# Patient Record
Sex: FEMALE | Race: WHITE | NOT HISPANIC OR LATINO | Employment: FULL TIME | URBAN - METROPOLITAN AREA
[De-identification: names, ages, dates, MRNs, and addresses within clinical notes are randomized per-mention and may not be internally consistent; named-entity substitution may affect disease eponyms.]

---

## 2023-07-31 ENCOUNTER — HOSPITAL ENCOUNTER (OUTPATIENT)
Dept: DATA CONVERSION | Facility: HOSPITAL | Age: 68
End: 2023-07-31
Attending: OTOLARYNGOLOGY | Admitting: OTOLARYNGOLOGY
Payer: COMMERCIAL

## 2023-07-31 DIAGNOSIS — E21.3 HYPERPARATHYROIDISM, UNSPECIFIED (MULTI): ICD-10-CM

## 2023-07-31 LAB
INTRAOPERATIVE PTH: 150 PG/ML (ref 12–88)
INTRAOPERATIVE PTH: 44 PG/ML (ref 12–88)
INTRAOPERATIVE PTH: 90 PG/ML (ref 12–88)

## 2023-08-08 LAB
COMPLETE PATHOLOGY REPORT: NORMAL
CONVERTED CLINICAL DIAGNOSIS-HISTORY: NORMAL
CONVERTED FINAL DIAGNOSIS: NORMAL
CONVERTED FINAL REPORT PDF LINK TO COPY AND PASTE: NORMAL
CONVERTED GROSS DESCRIPTION: NORMAL
CONVERTED INTRAOPERATIVE DIAGNOSIS: NORMAL

## 2023-08-16 LAB
ALBUMIN (G/DL) IN SER/PLAS: 4.2 G/DL (ref 3.4–5)
ANION GAP IN SER/PLAS: 11 MMOL/L (ref 10–20)
CALCIUM (MG/DL) IN SER/PLAS: 9.3 MG/DL (ref 8.6–10.6)
CARBON DIOXIDE, TOTAL (MMOL/L) IN SER/PLAS: 28 MMOL/L (ref 21–32)
CHLORIDE (MMOL/L) IN SER/PLAS: 105 MMOL/L (ref 98–107)
CREATININE (MG/DL) IN SER/PLAS: 0.72 MG/DL (ref 0.5–1.05)
GFR FEMALE: >90 ML/MIN/1.73M2
GLUCOSE (MG/DL) IN SER/PLAS: 107 MG/DL (ref 74–99)
PHOSPHATE (MG/DL) IN SER/PLAS: 2.9 MG/DL (ref 2.5–4.9)
POTASSIUM (MMOL/L) IN SER/PLAS: 4 MMOL/L (ref 3.5–5.3)
SODIUM (MMOL/L) IN SER/PLAS: 140 MMOL/L (ref 136–145)
UREA NITROGEN (MG/DL) IN SER/PLAS: 12 MG/DL (ref 6–23)

## 2023-08-17 LAB — PARATHYRIN INTACT (PG/ML) IN SER/PLAS: 67.3 PG/ML (ref 18.5–88)

## 2023-09-15 LAB
ALANINE AMINOTRANSFERASE (SGPT) (U/L) IN SER/PLAS: 26 U/L (ref 7–45)
ALBUMIN (G/DL) IN SER/PLAS: 4.3 G/DL (ref 3.4–5)
ALKALINE PHOSPHATASE (U/L) IN SER/PLAS: 63 U/L (ref 33–136)
ASPARTATE AMINOTRANSFERASE (SGOT) (U/L) IN SER/PLAS: 20 U/L (ref 9–39)
BILIRUBIN DIRECT (MG/DL) IN SER/PLAS: 0.1 MG/DL (ref 0–0.3)
BILIRUBIN TOTAL (MG/DL) IN SER/PLAS: 0.4 MG/DL (ref 0–1.2)
CHOLESTEROL (MG/DL) IN SER/PLAS: 168 MG/DL (ref 0–199)
CHOLESTEROL IN HDL (MG/DL) IN SER/PLAS: 46.9 MG/DL
CHOLESTEROL/HDL RATIO: 3.6
LDL: 73 MG/DL (ref 0–99)
NON HDL CHOLESTEROL: 121 MG/DL
PROTEIN TOTAL: 6.6 G/DL (ref 6.4–8.2)
TRIGLYCERIDE (MG/DL) IN SER/PLAS: 242 MG/DL (ref 0–149)
VLDL: 48 MG/DL (ref 0–40)

## 2023-09-17 PROBLEM — M51.369 DDD (DEGENERATIVE DISC DISEASE), LUMBAR: Status: ACTIVE | Noted: 2023-09-17

## 2023-09-17 PROBLEM — R49.0 CHRONIC HOARSENESS: Status: ACTIVE | Noted: 2023-06-08

## 2023-09-17 PROBLEM — H93.13 BILATERAL TINNITUS: Status: ACTIVE | Noted: 2023-05-04

## 2023-09-17 PROBLEM — E21.3 HYPERPARATHYROIDISM (MULTI): Status: ACTIVE | Noted: 2023-09-17

## 2023-09-17 PROBLEM — G47.00 INSOMNIA: Status: ACTIVE | Noted: 2023-09-17

## 2023-09-17 PROBLEM — E78.00 PURE HYPERCHOLESTEROLEMIA: Status: ACTIVE | Noted: 2023-09-17

## 2023-09-17 PROBLEM — M19.90 ARTHRITIS: Status: ACTIVE | Noted: 2023-09-17

## 2023-09-17 PROBLEM — K04.7 ABSCESSED TOOTH: Status: ACTIVE | Noted: 2023-09-17

## 2023-09-17 PROBLEM — N63.0 BREAST MASS: Status: ACTIVE | Noted: 2023-09-17

## 2023-09-17 PROBLEM — M54.16 LUMBAR RADICULITIS: Status: ACTIVE | Noted: 2023-09-17

## 2023-09-17 PROBLEM — J30.9 ALLERGIC RHINITIS: Status: ACTIVE | Noted: 2023-09-17

## 2023-09-17 PROBLEM — F40.243 ANXIETY WITH FLYING: Status: ACTIVE | Noted: 2023-09-17

## 2023-09-17 PROBLEM — J37.0 CHRONIC LARYNGITIS: Status: ACTIVE | Noted: 2023-06-08

## 2023-09-17 PROBLEM — M51.36 DDD (DEGENERATIVE DISC DISEASE), LUMBAR: Status: ACTIVE | Noted: 2023-09-17

## 2023-09-17 PROBLEM — R42 DIZZINESSES: Status: ACTIVE | Noted: 2023-09-17

## 2023-09-17 PROBLEM — E11.9 TYPE 2 DIABETES MELLITUS WITHOUT COMPLICATION, WITHOUT LONG-TERM CURRENT USE OF INSULIN (MULTI): Status: ACTIVE | Noted: 2023-09-17

## 2023-09-17 PROBLEM — E83.52 SERUM CALCIUM ELEVATED: Status: ACTIVE | Noted: 2023-09-17

## 2023-09-17 PROBLEM — H81.10 BENIGN PAROXYSMAL POSITIONAL VERTIGO: Status: ACTIVE | Noted: 2023-09-17

## 2023-09-17 PROBLEM — I10 BENIGN ESSENTIAL HYPERTENSION: Status: ACTIVE | Noted: 2023-09-17

## 2023-09-17 PROBLEM — H91.93 BILATERAL HEARING LOSS: Status: ACTIVE | Noted: 2023-09-17

## 2023-09-17 PROBLEM — F17.200 TOBACCO USE DISORDER: Status: ACTIVE | Noted: 2023-09-17

## 2023-09-17 PROBLEM — K21.9 GASTROESOPHAGEAL REFLUX DISEASE WITHOUT ESOPHAGITIS: Status: ACTIVE | Noted: 2023-06-08

## 2023-09-17 PROBLEM — C43.9 MALIGNANT MELANOMA OF SKIN (MULTI): Status: ACTIVE | Noted: 2023-09-17

## 2023-09-17 PROBLEM — H90.3 BILATERAL SENSORINEURAL HEARING LOSS: Status: ACTIVE | Noted: 2023-09-17

## 2023-09-17 PROBLEM — R73.03 PREDIABETES: Status: ACTIVE | Noted: 2023-09-17

## 2023-09-17 PROBLEM — K57.32 DIVERTICULITIS OF COLON: Status: ACTIVE | Noted: 2023-09-17

## 2023-09-17 RX ORDER — NIRMATRELVIR AND RITONAVIR 300-100 MG
KIT ORAL
COMMUNITY
Start: 2023-01-18

## 2023-09-17 RX ORDER — HYDROCHLOROTHIAZIDE 25 MG/1
TABLET ORAL
COMMUNITY

## 2023-09-17 RX ORDER — LOSARTAN POTASSIUM 25 MG/1
1 TABLET ORAL DAILY
COMMUNITY

## 2023-09-17 RX ORDER — TRIAMCINOLONE ACETONIDE 1 MG/G
1 CREAM TOPICAL 2 TIMES DAILY
COMMUNITY
Start: 2022-04-29

## 2023-09-17 RX ORDER — OXYCODONE HYDROCHLORIDE 5 MG/1
1 TABLET ORAL EVERY 6 HOURS
COMMUNITY
Start: 2021-11-17

## 2023-09-17 RX ORDER — ETODOLAC 400 MG/1
TABLET, FILM COATED ORAL
COMMUNITY

## 2023-09-17 RX ORDER — LISINOPRIL 20 MG/1
TABLET ORAL
COMMUNITY

## 2023-09-17 RX ORDER — PREDNISOLONE ACETATE 10 MG/ML
1 SUSPENSION/ DROPS OPHTHALMIC 4 TIMES DAILY
COMMUNITY
Start: 2023-05-28

## 2023-09-17 RX ORDER — IBUPROFEN 200 MG
1-4 TABLET ORAL 3 TIMES DAILY PRN
COMMUNITY

## 2023-09-17 RX ORDER — ATORVASTATIN CALCIUM 80 MG/1
1 TABLET, FILM COATED ORAL DAILY
COMMUNITY

## 2023-09-17 RX ORDER — FLUOCINOLONE ACETONIDE 0.11 MG/ML
5 OIL AURICULAR (OTIC) DAILY PRN
COMMUNITY
Start: 2023-05-04

## 2023-09-17 RX ORDER — LOSARTAN POTASSIUM 50 MG/1
1 TABLET ORAL DAILY
COMMUNITY
End: 2024-03-08 | Stop reason: SDUPTHER

## 2023-09-17 RX ORDER — CYCLOBENZAPRINE HCL 10 MG
1 TABLET ORAL 2 TIMES DAILY PRN
COMMUNITY
Start: 2021-03-08

## 2023-09-17 RX ORDER — METHOCARBAMOL 500 MG/1
1 TABLET, FILM COATED ORAL 3 TIMES DAILY
COMMUNITY
Start: 2021-12-01

## 2023-09-17 RX ORDER — PANTOPRAZOLE SODIUM 40 MG/1
1 TABLET, DELAYED RELEASE ORAL 2 TIMES DAILY
COMMUNITY
End: 2024-06-10

## 2023-09-17 RX ORDER — ALPRAZOLAM 0.5 MG/1
1 TABLET ORAL EVERY 4 HOURS PRN
COMMUNITY
Start: 2023-06-07 | End: 2024-03-08 | Stop reason: SDUPTHER

## 2023-09-17 RX ORDER — CIPROFLOXACIN HYDROCHLORIDE 3 MG/ML
1 SOLUTION/ DROPS OPHTHALMIC
COMMUNITY
Start: 2023-04-05

## 2023-09-17 RX ORDER — KETOROLAC TROMETHAMINE 5 MG/ML
1 SOLUTION OPHTHALMIC 2 TIMES DAILY
COMMUNITY

## 2023-09-17 RX ORDER — TRAZODONE HYDROCHLORIDE 50 MG/1
1-2 TABLET ORAL NIGHTLY
COMMUNITY

## 2023-09-17 RX ORDER — ONDANSETRON 4 MG/1
4 TABLET, ORALLY DISINTEGRATING ORAL EVERY 6 HOURS PRN
COMMUNITY
Start: 2021-11-12

## 2023-09-17 RX ORDER — ATORVASTATIN CALCIUM 10 MG/1
TABLET, FILM COATED ORAL
COMMUNITY

## 2023-09-17 RX ORDER — ATORVASTATIN CALCIUM 40 MG/1
1 TABLET, FILM COATED ORAL DAILY
COMMUNITY
Start: 2023-01-30

## 2023-09-17 RX ORDER — ACETAMINOPHEN 325 MG/1
120 TABLET ORAL EVERY 4 HOURS
COMMUNITY
Start: 2021-11-12

## 2023-09-17 RX ORDER — CHLORHEXIDINE GLUCONATE 40 MG/ML
SOLUTION TOPICAL
COMMUNITY
Start: 2021-11-04

## 2023-09-17 RX ORDER — MUPIROCIN 20 MG/G
OINTMENT TOPICAL
COMMUNITY
Start: 2021-11-04

## 2023-09-17 RX ORDER — VALACYCLOVIR HYDROCHLORIDE 1 G/1
2 TABLET, FILM COATED ORAL 2 TIMES DAILY
COMMUNITY
Start: 2022-12-13

## 2023-09-17 RX ORDER — PNV NO.95/FERROUS FUM/FOLIC AC 28MG-0.8MG
1 TABLET ORAL DAILY
COMMUNITY

## 2023-09-17 RX ORDER — OXYCODONE HYDROCHLORIDE 5 MG/1
1 TABLET ORAL EVERY 4 HOURS PRN
COMMUNITY
Start: 2021-11-12

## 2023-09-29 VITALS — HEIGHT: 64 IN | WEIGHT: 203.93 LBS | BODY MASS INDEX: 34.82 KG/M2

## 2023-09-30 NOTE — H&P
History & Physical Reviewed:   I have reviewed the History and Physical dated:  19-Jul-2023   History and Physical reviewed and relevant findings noted. Patient examined to review pertinent physical  findings.: No significant changes   Home Medications Reviewed: no changes noted   Allergies Reviewed: no changes noted       ERAS (Enhanced Recovery After Surgery):  ·  ERAS Patient: no     Consent:   COVID-19 Consent:  ·  COVID-19 Risk Consent Surgeon has reviewed key risks related to the risk of josefina COVID-19 and if they contract COVID-19 what the risks are.     Attestation:   Note Completion:  I am a:  Resident/Fellow   Attending Attestation I saw and evaluated the patient.  I personally obtained the key and critical portions of the history and physical exam or was physically present for key and  critical portions performed by the resident/fellow. I reviewed the resident/fellow?s documentation and discussed the patient with the resident/fellow.  I agree with the resident/fellow?s medical decision making as documented in the note.     I personally evaluated the patient on 31-Jul-2023         Electronic Signatures:  Orlin Higgins (DO (Resident))  (Signed 31-Jul-2023 05:19)   Authored: History & Physical Reviewed, ERAS, Consent,  Note Completion  Edilberto Fallon)  (Signed 31-Jul-2023 13:32)   Authored: Note Completion   Co-Signer: History & Physical Reviewed, ERAS, Consent, Note Completion      Last Updated: 31-Jul-2023 13:32 by Edilberto Fallon)

## 2023-10-02 NOTE — OP NOTE
PROCEDURE DETAILS    Preoperative Diagnosis:  Hyperparathyroidism  Postoperative Diagnosis:  Hyperparathyroidism  Surgeon: Dr. Edilberto Fallon MD  Resident/Fellow/Other Assistant: Asaf Higgins DO    Procedure:  Parathyroidectomy  Anesthesia: General  Estimated Blood Loss: 5 cc  Findings: See operative report                                Attestation:   Note Completion:  Attending Attestation I was present for the entire procedure    I am a: Resident/Fellow         Electronic Signatures:  Orlin Higgins ( (Resident))  (Signed 31-Jul-2023 15:07)   Authored: Post-Operative Note, Chart Review, Note Completion  Edilberto Fallon)  (Signed 31-Jul-2023 15:09)   Authored: Note Completion   Co-Signer: Post-Operative Note, Chart Review, Note Completion      Last Updated: 31-Jul-2023 15:09 by Edilberto Fallon)

## 2024-03-08 DIAGNOSIS — F40.243 ANXIETY WITH FLYING: Primary | ICD-10-CM

## 2024-03-08 DIAGNOSIS — E11.9 TYPE 2 DIABETES MELLITUS WITHOUT COMPLICATION, WITHOUT LONG-TERM CURRENT USE OF INSULIN (MULTI): ICD-10-CM

## 2024-03-08 RX ORDER — LOSARTAN POTASSIUM 50 MG/1
50 TABLET ORAL DAILY
Qty: 90 TABLET | Refills: 0 | Status: SHIPPED | OUTPATIENT
Start: 2024-03-08 | End: 2024-03-15

## 2024-03-08 RX ORDER — ALPRAZOLAM 0.5 MG/1
0.5 TABLET ORAL DAILY PRN
Qty: 20 TABLET | Refills: 0 | Status: SHIPPED | OUTPATIENT
Start: 2024-03-08

## 2024-03-15 DIAGNOSIS — E11.9 TYPE 2 DIABETES MELLITUS WITHOUT COMPLICATION, WITHOUT LONG-TERM CURRENT USE OF INSULIN (MULTI): ICD-10-CM

## 2024-03-15 RX ORDER — LOSARTAN POTASSIUM 50 MG/1
50 TABLET ORAL DAILY
Qty: 90 TABLET | Refills: 0 | Status: SHIPPED | OUTPATIENT
Start: 2024-03-15

## 2024-05-06 NOTE — OP NOTE
PREOPERATIVE DIAGNOSIS:  Hyperparathyroidism.    POSTOPERATIVE DIAGNOSIS:  Hyperparathyroidism.    OPERATION/PROCEDURE:  Parathyroidectomy.    SURGEON:  Edilberto Faloln MD.    ASSISTANT(S):  Dr. Higgins.    ANESTHESIA:    OPERATIVE INDICATION:  This patient has had borderline elevated calcium for sometime.  She  has a PTH that is approximately 150.  She was found on the sestamibi  to have a significant apathy in the right lower area.  She is here  today to have this addressed surgically.  She understands surgery and  possible complications, especially in relation to the possibility of  not solving the problem or causing injury to the recurrent laryngeal  nerve and she wishes to proceed.     DESCRIPTION OF OPERATION:  Under general anesthesia, a nerve integrity monitoring endotracheal  tube was inserted with the help of the GlideScope.  The position of  the tube was verified with the GlideScope and the monitoring system  and was found to be adequate.  The patient was prepped and draped in  usual sterile fashion.  The incision was outlined approximately 1  fingerbreadth above the clavicular head.  The incision was made,  carried through the underlying soft tissue through the platysma.  There was a fairly large anterior jugular vein on the right side  which was taken with the Harmonic and then clipped.  Dissection was  then carried lateral to the strap musculature.  The internal jugular  vein was encountered and the first PTH level was obtained.  Then,  further dissection was carried medial to that area.  On palpation, we  could feel an induration.  The dissection was carried towards this  area and a larger parathyroid was identified.  It was gently  dissected from the surrounding soft tissue.  The vessels were taken  with the Harmonic Scalpel.  The gland was removed.  A 5- and  10-minute PTH levels were obtained.  The frozen sections showed the  parathyroid that weighed 1.4 g.  The PTH levels were  originally  149.6.  At 5 and 10 minutes, they were 89.5 and 44.2 respectively.  It was not felt that anything further needed to be done.  The wound  was irrigated.  Fibrillar was applied to the surgical bed.  The wound  was closed in 2 planes, subcutaneous plane with inverting 3-0 Vicryl  suture and the skin with a running subcuticular 4-0 Monocryl.  Steri-Strips were applied to the wound.  The procedure was  terminated.  The patient was sent back to Recovery in fair condition.       Edilberto Fallon MD    DD:  07/31/2023 15:02:00 EST  DT:  07/31/2023 15:13:57 EST  DICTATION NUMBER:  [id number]  INTERNAL JOB NUMBER:  [id number]    CC:  UNKNOWN UNKNOWN  Edilberto Fallon MD        Electronic Signatures:  Edilberto Fallon) (Signed on 31-Jul-2023 18:06)   Authored  Unsigned, Draft (SYS GENERATED) (Entered on 31-Jul-2023 15:13)   Entered    Last Updated: 31-Jul-2023 18:06 by Edilberto Fallon)

## 2024-06-07 DIAGNOSIS — G47.00 INSOMNIA, UNSPECIFIED TYPE: ICD-10-CM

## 2024-06-07 DIAGNOSIS — K21.9 GASTROESOPHAGEAL REFLUX DISEASE WITHOUT ESOPHAGITIS: Primary | ICD-10-CM

## 2024-06-10 RX ORDER — PANTOPRAZOLE SODIUM 40 MG/1
40 TABLET, DELAYED RELEASE ORAL DAILY
Qty: 30 TABLET | Refills: 0 | Status: SHIPPED | OUTPATIENT
Start: 2024-06-10

## 2024-06-10 RX ORDER — TRAZODONE HYDROCHLORIDE 50 MG/1
TABLET ORAL
Qty: 60 TABLET | Refills: 0 | OUTPATIENT
Start: 2024-06-10

## 2024-06-10 NOTE — TELEPHONE ENCOUNTER
Unknown to me. On a lot of pantoprazole. Will cover with ONCE DAILY for 30 days. Needs an appt please and we can discuss if twice daily is appropriate

## 2024-06-18 ENCOUNTER — TELEPHONE (OUTPATIENT)
Dept: PRIMARY CARE | Facility: CLINIC | Age: 69
End: 2024-06-18
Payer: COMMERCIAL

## 2024-06-18 DIAGNOSIS — E11.9 TYPE 2 DIABETES MELLITUS WITHOUT COMPLICATION, WITHOUT LONG-TERM CURRENT USE OF INSULIN (MULTI): ICD-10-CM

## 2024-06-18 DIAGNOSIS — G47.00 INSOMNIA, UNSPECIFIED TYPE: Primary | ICD-10-CM

## 2024-06-18 RX ORDER — LOSARTAN POTASSIUM 50 MG/1
50 TABLET ORAL DAILY
Qty: 90 TABLET | Refills: 0 | Status: SHIPPED | OUTPATIENT
Start: 2024-06-18

## 2024-06-18 RX ORDER — TRAZODONE HYDROCHLORIDE 50 MG/1
50 TABLET ORAL NIGHTLY
Qty: 90 TABLET | Refills: 0 | Status: SHIPPED | OUTPATIENT
Start: 2024-06-18

## 2024-08-12 ENCOUNTER — OFFICE VISIT (OUTPATIENT)
Dept: PRIMARY CARE | Facility: CLINIC | Age: 69
End: 2024-08-12
Payer: COMMERCIAL

## 2024-08-12 VITALS
WEIGHT: 179.8 LBS | BODY MASS INDEX: 30.7 KG/M2 | HEIGHT: 64 IN | RESPIRATION RATE: 18 BRPM | DIASTOLIC BLOOD PRESSURE: 88 MMHG | SYSTOLIC BLOOD PRESSURE: 140 MMHG | OXYGEN SATURATION: 97 % | HEART RATE: 63 BPM | TEMPERATURE: 98.2 F

## 2024-08-12 DIAGNOSIS — I10 BENIGN ESSENTIAL HYPERTENSION: ICD-10-CM

## 2024-08-12 DIAGNOSIS — Z76.89 ENCOUNTER TO ESTABLISH CARE WITH NEW DOCTOR: Primary | ICD-10-CM

## 2024-08-12 DIAGNOSIS — F40.243 ANXIETY WITH FLYING: ICD-10-CM

## 2024-08-12 DIAGNOSIS — E11.9 TYPE 2 DIABETES MELLITUS WITHOUT COMPLICATION, WITHOUT LONG-TERM CURRENT USE OF INSULIN (MULTI): ICD-10-CM

## 2024-08-12 DIAGNOSIS — E78.00 PURE HYPERCHOLESTEROLEMIA: ICD-10-CM

## 2024-08-12 DIAGNOSIS — B00.1 RECURRENT COLD SORES: ICD-10-CM

## 2024-08-12 DIAGNOSIS — G47.00 INSOMNIA, UNSPECIFIED TYPE: ICD-10-CM

## 2024-08-12 DIAGNOSIS — K21.9 GASTROESOPHAGEAL REFLUX DISEASE WITHOUT ESOPHAGITIS: ICD-10-CM

## 2024-08-12 DIAGNOSIS — M54.16 LUMBAR RADICULITIS: ICD-10-CM

## 2024-08-12 DIAGNOSIS — Z12.83 SKIN CANCER SCREENING: ICD-10-CM

## 2024-08-12 DIAGNOSIS — Z00.00 ROUTINE HEALTH MAINTENANCE: ICD-10-CM

## 2024-08-12 DIAGNOSIS — Z11.59 NEED FOR HEPATITIS C SCREENING TEST: ICD-10-CM

## 2024-08-12 PROBLEM — E21.3 HYPERPARATHYROIDISM (MULTI): Status: RESOLVED | Noted: 2023-09-17 | Resolved: 2024-08-12

## 2024-08-12 PROBLEM — R73.03 PREDIABETES: Status: RESOLVED | Noted: 2023-09-17 | Resolved: 2024-08-12

## 2024-08-12 PROBLEM — E83.52 SERUM CALCIUM ELEVATED: Status: RESOLVED | Noted: 2023-09-17 | Resolved: 2024-08-12

## 2024-08-12 LAB — POC HEMOGLOBIN A1C: 5.6 % (ref 4.2–6.5)

## 2024-08-12 PROCEDURE — 1159F MED LIST DOCD IN RCRD: CPT | Performed by: FAMILY MEDICINE

## 2024-08-12 PROCEDURE — 1125F AMNT PAIN NOTED PAIN PRSNT: CPT | Performed by: FAMILY MEDICINE

## 2024-08-12 PROCEDURE — 99214 OFFICE O/P EST MOD 30 MIN: CPT | Performed by: FAMILY MEDICINE

## 2024-08-12 PROCEDURE — 4010F ACE/ARB THERAPY RXD/TAKEN: CPT | Performed by: FAMILY MEDICINE

## 2024-08-12 PROCEDURE — 3077F SYST BP >= 140 MM HG: CPT | Performed by: FAMILY MEDICINE

## 2024-08-12 PROCEDURE — 3008F BODY MASS INDEX DOCD: CPT | Performed by: FAMILY MEDICINE

## 2024-08-12 PROCEDURE — 1160F RVW MEDS BY RX/DR IN RCRD: CPT | Performed by: FAMILY MEDICINE

## 2024-08-12 PROCEDURE — 3079F DIAST BP 80-89 MM HG: CPT | Performed by: FAMILY MEDICINE

## 2024-08-12 PROCEDURE — 83036 HEMOGLOBIN GLYCOSYLATED A1C: CPT | Mod: QW,91 | Performed by: FAMILY MEDICINE

## 2024-08-12 RX ORDER — ALPRAZOLAM 0.5 MG/1
0.5 TABLET ORAL DAILY PRN
Qty: 30 TABLET | Refills: 0 | Status: SHIPPED | OUTPATIENT
Start: 2024-08-12

## 2024-08-12 RX ORDER — ATORVASTATIN CALCIUM 80 MG/1
80 TABLET, FILM COATED ORAL DAILY
Qty: 90 TABLET | Refills: 0 | Status: SHIPPED | OUTPATIENT
Start: 2024-08-12

## 2024-08-12 RX ORDER — CYCLOBENZAPRINE HCL 10 MG
10 TABLET ORAL 2 TIMES DAILY PRN
Qty: 20 TABLET | Refills: 0 | Status: SHIPPED | OUTPATIENT
Start: 2024-08-12 | End: 2024-08-15 | Stop reason: SDUPTHER

## 2024-08-12 RX ORDER — ZOLPIDEM TARTRATE 5 MG/1
5 TABLET ORAL NIGHTLY PRN
Qty: 30 TABLET | Refills: 0 | Status: SHIPPED | OUTPATIENT
Start: 2024-08-12 | End: 2024-10-11

## 2024-08-12 RX ORDER — PANTOPRAZOLE SODIUM 40 MG/1
40 TABLET, DELAYED RELEASE ORAL DAILY
Qty: 90 TABLET | Refills: 1 | Status: SHIPPED | OUTPATIENT
Start: 2024-08-12

## 2024-08-12 RX ORDER — VALACYCLOVIR HYDROCHLORIDE 1 G/1
500 TABLET, FILM COATED ORAL DAILY
Qty: 90 TABLET | Refills: 3 | Status: SHIPPED | OUTPATIENT
Start: 2024-08-12 | End: 2024-08-15 | Stop reason: SDUPTHER

## 2024-08-12 RX ORDER — LOSARTAN POTASSIUM 50 MG/1
50 TABLET ORAL DAILY
Qty: 90 TABLET | Refills: 0 | Status: SHIPPED | OUTPATIENT
Start: 2024-08-12

## 2024-08-12 ASSESSMENT — ENCOUNTER SYMPTOMS
OCCASIONAL FEELINGS OF UNSTEADINESS: 0
DEPRESSION: 0
LOSS OF SENSATION IN FEET: 0

## 2024-08-12 ASSESSMENT — COLUMBIA-SUICIDE SEVERITY RATING SCALE - C-SSRS
2. HAVE YOU ACTUALLY HAD ANY THOUGHTS OF KILLING YOURSELF?: NO
1. IN THE PAST MONTH, HAVE YOU WISHED YOU WERE DEAD OR WISHED YOU COULD GO TO SLEEP AND NOT WAKE UP?: NO
6. HAVE YOU EVER DONE ANYTHING, STARTED TO DO ANYTHING, OR PREPARED TO DO ANYTHING TO END YOUR LIFE?: NO

## 2024-08-12 ASSESSMENT — PAIN SCALES - GENERAL: PAINLEVEL: 6

## 2024-08-12 ASSESSMENT — PATIENT HEALTH QUESTIONNAIRE - PHQ9
1. LITTLE INTEREST OR PLEASURE IN DOING THINGS: NOT AT ALL
2. FEELING DOWN, DEPRESSED OR HOPELESS: NOT AT ALL
SUM OF ALL RESPONSES TO PHQ9 QUESTIONS 1 & 2: 0

## 2024-08-12 NOTE — PATIENT INSTRUCTIONS
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    Relevant Medications    ALPRAZolam (Xanax) 0.5 mg tablet    Benign essential hypertension I10    Relevant Orders    Comprehensive Metabolic Panel    Lipid Panel    CBC    TSH with reflex to Free T4 if abnormal    Gastroesophageal reflux disease without esophagitis K21.9    Relevant Medications    pantoprazole (ProtoNix) 40 mg EC tablet    Insomnia G47.00    Relevant Medications    zolpidem (Ambien) 5 mg tablet    Lumbar radiculitis M54.16    Relevant Medications    cyclobenzaprine (Flexeril) 10 mg tablet    Pure hypercholesterolemia E78.00    Relevant Medications    atorvastatin (Lipitor) 80 mg tablet    Other Relevant Orders    Comprehensive Metabolic Panel    Lipid Panel    CBC    TSH with reflex to Free T4 if abnormal    RESOLVED: Type 2 diabetes mellitus without complication, without long-term current use of insulin (Multi) E11.9    Relevant Medications    losartan (Cozaar) 50 mg tablet    Other Relevant Orders    POCT glycosylated hemoglobin (Hb A1C) manually resulted (Completed)    Comprehensive Metabolic Panel    Lipid Panel    CBC    TSH with reflex to Free T4 if abnormal     Other Visit Diagnoses         Codes    Encounter to establish care with new doctor    -  Primary Z76.89    Skin cancer screening     Z12.83    Relevant Orders    Referral to Dermatology    Need for hepatitis C screening test     Z11.59    Relevant Orders    Hepatitis C Antibody    Routine health maintenance     Z00.00    Relevant Orders    Comprehensive Metabolic Panel    Lipid Panel    CBC    TSH with reflex to Free T4 if abnormal    Hepatitis C Antibody    Recurrent cold sores     B00.1    Relevant Medications    valACYclovir (Valtrex) 1 gram tablet            Additional Visit Plans:  Doing well on most of your medicines. Sleep could be better so lets try ambien instead of the trazodone. I expect this should be a better fit compared to the  xanax at night. Save that for panic attacks or flying    Will do regular labs and let you know if any dose adjustments are needed in your medicines.     A1c is in a normal range, not even prediabetes. Well done.     Follow up in 3-6 months for medicines. Let me know how the ambien works.     Plan to try daily suppressive therapy for the cold sores.     Next Wellness Exam/Annual Physical Due  Please return at your earliest convenience for complete physical.  The complete physical allows us to review appropriate cancer screenings, routine blood work and immunizations.  Will order routine annual blood work in preparation for your physical     Patient Care Team:  Piotr Saleem DO as PCP - General (Family Medicine)  Cathy Contreras MD as PCP - [facility] P[facility]    Piotr Saleem DO   08/12/24   3:20 PM

## 2024-08-12 NOTE — PROGRESS NOTES
Outpatient Visit Note    Chief Complaint   Patient presents with    pre DM       HPI:  Bre Escalante is a 68 y.o. female here to establish care and discuss her prediabetes.    Previous PCP is Dr. Contreras.     She is listed as having a diagnosis of type 2 diabetes but herself states that she has prediabetes. She wants an A1c done today in house. Denies any hypoglycemia, polyuria, polyphagia or polydipsia.  Is not on a medication for this.    Does have hypertension for which she is on losartan daily.  Does measure blood pressure readings at home (130s/80s) but denies any headaches, blurry vision, nosebleeds, chest pain, shortness of dizziness or lower extremity edema.  No concerning side effects.    Is on 80 mg of atorvastatin once daily for hyperlipidemia.  No myalgia or chest pain.  No side effects.    Has a history of hyperparathyroidism, s/p surgical removal of one.     Does NOT have malignant melanoma per patient.    Takes trazodone for insomnia (sometimes works, has been up to 200mg before but it did not work) and Xanax as needed for anxiety with flying.  Has good support, no side effects. Needs more refills. Sometimes uses the xanax to help her sleep and this has been effective.     Takes pantoprazole 40 mg once daily for reflux.  Last EGD was reportedly done about 2 years ago.  No breakthrough heartburn on this. Takes this once daily.     Says she is having back problems again. Threw her back out after moving a plant 3 weeks ago. Comes and goes, flares back up. Not as bad when it first happened. Usually has flexeril for muscle spasms in her back which she did not need much after her laminectomy. Using it for her back the night of tweaking hr back helped. Makes her tired. Otherwise taking ibuprofen. NO loss of bowel or bladder control, saddle paresthesias or foot drop.            Patient Active Problem List    Diagnosis Date Noted    Abscessed tooth 09/17/2023    Allergic rhinitis 09/17/2023    Anxiety  with flying 09/17/2023    Arthritis 09/17/2023    Benign essential hypertension 09/17/2023    Benign paroxysmal positional vertigo 09/17/2023    Bilateral hearing loss 09/17/2023    Bilateral sensorineural hearing loss 09/17/2023    Breast mass 09/17/2023    DDD (degenerative disc disease), lumbar 09/17/2023    Diverticulitis of colon 09/17/2023    Dizzinesses 09/17/2023    Insomnia 09/17/2023    Lumbar radiculitis 09/17/2023    Pure hypercholesterolemia 09/17/2023    Tobacco use disorder 09/17/2023    Chronic hoarseness 06/08/2023    Chronic laryngitis 06/08/2023    Gastroesophageal reflux disease without esophagitis 06/08/2023    Bilateral tinnitus 05/04/2023        Past Medical History:   Diagnosis Date    Anxiety disorder, unspecified     Anxiety    Eczema     Heart palpitations     High cholesterol     HL (hearing loss)     Hyperparathyroidism (Multi) 09/17/2023    Meningitis ([facility])     Osteoporosis     Personal history of other diseases of the circulatory system     History of hypertension    Prediabetes 09/17/2023        Current Medications  Current Outpatient Medications   Medication Instructions    acetaminophen (TylenoL) 325 mg tablet 120 tablets, oral, Every 4 hours    ALPRAZolam (XANAX) 0.5 mg, oral, Daily PRN    atorvastatin (LIPITOR) 80 mg, oral, Daily, For 30 days    calcium carbonate-vitamin D3 (Oscal-500) 500 mg-10 mcg (400 unit) tablet 1 capsule, oral, Daily, For 30 days     cyclobenzaprine (FLEXERIL) 10 mg, oral, 2 times daily PRN, For 30 days    ibuprofen 200 mg tablet 1-4 tablets, oral, 3 times daily PRN, with food or milk    losartan (COZAAR) 50 mg, oral, Daily    pantoprazole (PROTONIX) 40 mg, oral, Daily    triamcinolone (Kenalog) 0.1 % cream 1 Application, Topical, 2 times daily, For 30 days     valACYclovir (VALTREX) 500 mg, oral, Daily    zolpidem (AMBIEN) 5 mg, oral, Nightly PRN        Allergies  Allergies   Allergen Reactions    Codeine Unknown    Doxycycline Other        Past  Surgical History:   Procedure Laterality Date    BACK SURGERY      Laminectomy    BREAST LUMPECTOMY  08/04/2014    Breast Surgery Lumpectomy    CATARACT EXTRACTION, BILATERAL      HYSTEROSCOPY  06/19/2013    Hysteroscopy With Endometrial Ablation    KNEE SURGERY  08/04/2014    Knee Surgery    OTHER SURGICAL HISTORY  08/04/2014    Elbow Surgery Excision    PARATHYROIDECTOMY      TONSILLECTOMY  08/04/2014    Tonsillectomy     Family History   Problem Relation Name Age of Onset    Other (PACU) Mother          after    Diabetes Mother      Hypertension Mother      Melanoma Mother      Heart disease Mother      Asthma Mother      Hypertension Father      Melanoma Father      Melanoma Sister      Melanoma Sister      Melanoma Brother      Melanoma Brother       Social History     Tobacco Use    Smoking status: Former     Types: Cigarettes    Smokeless tobacco: Never   Vaping Use    Vaping status: Every Day    Substances: Nicotine    Devices: Refillable tank   Substance Use Topics    Alcohol use: Yes     Alcohol/week: 3.0 standard drinks of alcohol     Types: 3 Standard drinks or equivalent per week    Drug use: Never     Tobacco Use: Medium Risk (8/12/2024)    Patient History     Smoking Tobacco Use: Former     Smokeless Tobacco Use: Never     Passive Exposure: Not on file        ROS  All pertinent positive symptoms are included in the history of present illness.  All other systems have been reviewed and are negative and noncontributory to this patient's current ailments.    VITAL SIGNS  Vitals:    08/12/24 1414   BP: 140/88   Pulse: 63   Resp: 18   Temp: 36.8 °C (98.2 °F)   SpO2: 97%     Vitals:    08/12/24 1414   Weight: 81.6 kg (179 lb 12.8 oz)      Body mass index is 31.11 kg/m².     PHYSICAL EXAM  GENERAL APPEARANCE: well nourished, well developed, looks like stated age, in no acute distress, not ill or tired appearing, conversing well.   HEENT: no trauma, normocephalic.   NECK: no nodes, supple without rigidity, no  neck mass was observed,  no thyromegaly.   HEART: regular rate and rhythm, S1 and S2 heard with no murmurs or skipped beats. No carotid bruits.  LUNGS: clear to auscultation bilaterally with no wheezes, crackles or rales.   EXTREMITIES: moving all extremities equally with no edema or deformities.   SKIN: normal skin color and pigmentation, normal skin turgor without rash, lesions, or nodules visualized.   NEUROLOGIC EXAM: CN II-XII grossly intact, normal gait, normal balance.   PSYCH: mood and affect appropriate; alert and oriented to time, place, person; no difficulty with speech or language.     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    Relevant Medications    ALPRAZolam (Xanax) 0.5 mg tablet    Benign essential hypertension I10    Relevant Orders    Comprehensive Metabolic Panel    Lipid Panel    CBC    TSH with reflex to Free T4 if abnormal    Gastroesophageal reflux disease without esophagitis K21.9    Relevant Medications    pantoprazole (ProtoNix) 40 mg EC tablet    Insomnia G47.00    Relevant Medications    zolpidem (Ambien) 5 mg tablet    Lumbar radiculitis M54.16    Relevant Medications    cyclobenzaprine (Flexeril) 10 mg tablet    Pure hypercholesterolemia E78.00    Relevant Medications    atorvastatin (Lipitor) 80 mg tablet    Other Relevant Orders    Comprehensive Metabolic Panel    Lipid Panel    CBC    TSH with reflex to Free T4 if abnormal    RESOLVED: Type 2 diabetes mellitus without complication, without long-term current use of insulin (Multi) E11.9    Relevant Medications    losartan (Cozaar) 50 mg tablet    Other Relevant Orders    POCT  glycosylated hemoglobin (Hb A1C) manually resulted (Completed)    Comprehensive Metabolic Panel    Lipid Panel    CBC    TSH with reflex to Free T4 if abnormal     Other Visit Diagnoses         Codes    Encounter to establish care with new doctor    -  Primary Z76.89    Skin cancer screening     Z12.83    Relevant Orders    Referral to Dermatology    Need for hepatitis C screening test     Z11.59    Relevant Orders    Hepatitis C Antibody    Routine health maintenance     Z00.00    Relevant Orders    Comprehensive Metabolic Panel    Lipid Panel    CBC    TSH with reflex to Free T4 if abnormal    Hepatitis C Antibody    Recurrent cold sores     B00.1    Relevant Medications    valACYclovir (Valtrex) 1 gram tablet            Additional Visit Plans:  Doing well on most of your medicines. Sleep could be better so lets try ambien instead of the trazodone. I expect this should be a better fit compared to the xanax at night. Save that for panic attacks or flying    Will do regular labs and let you know if any dose adjustments are needed in your medicines.     A1c is in a normal range, not even prediabetes. Well done.     Follow up in 3-6 months for medicines. Let me know how the ambien works.     Plan to try daily suppressive therapy for the cold sores.     Next Wellness Exam/Annual Physical Due  Please return at your earliest convenience for complete physical.  The complete physical allows us to review appropriate cancer screenings, routine blood work and immunizations.  Will order routine annual blood work in preparation for your physical     Patient Care Team:  Piotr Saleem DO as PCP - General (Family Medicine)  Cathy Contreras MD as PCP - [facility] PCP    Piotr Saleem DO   08/12/24   3:20 PM

## 2024-08-13 ENCOUNTER — TELEPHONE (OUTPATIENT)
Dept: PRIMARY CARE | Facility: CLINIC | Age: 69
End: 2024-08-13
Payer: COMMERCIAL

## 2024-08-13 DIAGNOSIS — M54.16 LUMBAR RADICULITIS: ICD-10-CM

## 2024-08-13 DIAGNOSIS — B00.1 RECURRENT COLD SORES: ICD-10-CM

## 2024-08-13 NOTE — TELEPHONE ENCOUNTER
Justino from [facility] called asking about two scripts that were sent over yesterday, Cyclobenzaprine and Valacyclovir.     Cyclobenzaprine order that was sent is for 20 tabs for 10 days. Wanted to be sure this is what you meant for the order or if it was to last 30 days.     Valacyclovir medication was sent for 90 day however will equal out to 180 day. Is asking if this is what you wanted or meant. Mentioned that if you would like for the patient to take 500 mg. They can fill the script for 500 and then that will equal to the 90 day supply. They are unsure if you are wanting to titrate the medication. Please call back and ask for Justino Pharmacist.

## 2024-08-15 RX ORDER — VALACYCLOVIR HYDROCHLORIDE 500 MG/1
500 TABLET, FILM COATED ORAL DAILY
Qty: 90 TABLET | Refills: 3 | Status: SHIPPED | OUTPATIENT
Start: 2024-08-15

## 2024-08-15 RX ORDER — CYCLOBENZAPRINE HCL 10 MG
10 TABLET ORAL 2 TIMES DAILY PRN
Qty: 20 TABLET | Refills: 0 | Status: SHIPPED | OUTPATIENT
Start: 2024-08-15

## 2024-08-21 ENCOUNTER — TELEPHONE (OUTPATIENT)
Dept: ENDOCRINOLOGY | Facility: CLINIC | Age: 69
End: 2024-08-21
Payer: COMMERCIAL

## 2024-08-21 NOTE — TELEPHONE ENCOUNTER
Pt called and LM with office requesting reasoning for upcoming new patient visit. Returned call and LM with NPV appointment details and ref by former PCP. Provided office contact information

## 2024-09-09 DIAGNOSIS — E78.00 PURE HYPERCHOLESTEROLEMIA: ICD-10-CM

## 2024-09-11 RX ORDER — ATORVASTATIN CALCIUM 80 MG/1
80 TABLET, FILM COATED ORAL DAILY
Qty: 90 TABLET | Refills: 0 | Status: SHIPPED | OUTPATIENT
Start: 2024-09-11

## 2024-09-13 ENCOUNTER — LAB (OUTPATIENT)
Dept: LAB | Facility: LAB | Age: 69
End: 2024-09-13
Payer: COMMERCIAL

## 2024-09-13 DIAGNOSIS — Z11.59 NEED FOR HEPATITIS C SCREENING TEST: ICD-10-CM

## 2024-09-13 DIAGNOSIS — E78.00 PURE HYPERCHOLESTEROLEMIA: ICD-10-CM

## 2024-09-13 DIAGNOSIS — E11.9 TYPE 2 DIABETES MELLITUS WITHOUT COMPLICATION, WITHOUT LONG-TERM CURRENT USE OF INSULIN (MULTI): ICD-10-CM

## 2024-09-13 DIAGNOSIS — Z00.00 ROUTINE HEALTH MAINTENANCE: ICD-10-CM

## 2024-09-13 DIAGNOSIS — I10 BENIGN ESSENTIAL HYPERTENSION: ICD-10-CM

## 2024-09-13 LAB
ALBUMIN SERPL BCP-MCNC: 4.1 G/DL (ref 3.4–5)
ALP SERPL-CCNC: 56 U/L (ref 33–136)
ALT SERPL W P-5'-P-CCNC: 14 U/L (ref 7–45)
ANION GAP SERPL CALC-SCNC: 13 MMOL/L (ref 10–20)
AST SERPL W P-5'-P-CCNC: 14 U/L (ref 9–39)
BILIRUB SERPL-MCNC: 0.4 MG/DL (ref 0–1.2)
BUN SERPL-MCNC: 14 MG/DL (ref 6–23)
CALCIUM SERPL-MCNC: 8.9 MG/DL (ref 8.6–10.3)
CHLORIDE SERPL-SCNC: 104 MMOL/L (ref 98–107)
CHOLEST SERPL-MCNC: 147 MG/DL (ref 0–199)
CHOLESTEROL/HDL RATIO: 3
CO2 SERPL-SCNC: 27 MMOL/L (ref 21–32)
CREAT SERPL-MCNC: 0.82 MG/DL (ref 0.5–1.05)
EGFRCR SERPLBLD CKD-EPI 2021: 79 ML/MIN/1.73M*2
ERYTHROCYTE [DISTWIDTH] IN BLOOD BY AUTOMATED COUNT: 13.8 % (ref 11.5–14.5)
GLUCOSE SERPL-MCNC: 91 MG/DL (ref 74–99)
HCT VFR BLD AUTO: 48.5 % (ref 36–46)
HCV AB SER QL: NONREACTIVE
HDLC SERPL-MCNC: 49.2 MG/DL
HGB BLD-MCNC: 15.6 G/DL (ref 12–16)
LDLC SERPL CALC-MCNC: 82 MG/DL
MCH RBC QN AUTO: 29.8 PG (ref 26–34)
MCHC RBC AUTO-ENTMCNC: 32.2 G/DL (ref 32–36)
MCV RBC AUTO: 93 FL (ref 80–100)
NON HDL CHOLESTEROL: 98 MG/DL (ref 0–149)
NRBC BLD-RTO: 0 /100 WBCS (ref 0–0)
PLATELET # BLD AUTO: 208 X10*3/UL (ref 150–450)
POTASSIUM SERPL-SCNC: 4 MMOL/L (ref 3.5–5.3)
PROT SERPL-MCNC: 6.6 G/DL (ref 6.4–8.2)
RBC # BLD AUTO: 5.24 X10*6/UL (ref 4–5.2)
SODIUM SERPL-SCNC: 140 MMOL/L (ref 136–145)
TRIGL SERPL-MCNC: 79 MG/DL (ref 0–149)
TSH SERPL-ACNC: 1.07 MIU/L (ref 0.44–3.98)
VLDL: 16 MG/DL (ref 0–40)
WBC # BLD AUTO: 5.7 X10*3/UL (ref 4.4–11.3)

## 2024-09-13 PROCEDURE — 86803 HEPATITIS C AB TEST: CPT

## 2024-09-13 PROCEDURE — 84443 ASSAY THYROID STIM HORMONE: CPT

## 2024-09-13 PROCEDURE — 80061 LIPID PANEL: CPT

## 2024-09-13 PROCEDURE — 36415 COLL VENOUS BLD VENIPUNCTURE: CPT

## 2024-09-13 PROCEDURE — 80053 COMPREHEN METABOLIC PANEL: CPT

## 2024-09-13 PROCEDURE — 85027 COMPLETE CBC AUTOMATED: CPT

## 2024-09-18 ENCOUNTER — APPOINTMENT (OUTPATIENT)
Dept: ENDOCRINOLOGY | Facility: CLINIC | Age: 69
End: 2024-09-18
Payer: COMMERCIAL

## 2024-09-18 VITALS
BODY MASS INDEX: 30.79 KG/M2 | HEART RATE: 69 BPM | WEIGHT: 178 LBS | DIASTOLIC BLOOD PRESSURE: 85 MMHG | SYSTOLIC BLOOD PRESSURE: 145 MMHG

## 2024-09-18 DIAGNOSIS — E21.0 PRIMARY HYPERPARATHYROIDISM (MULTI): Primary | ICD-10-CM

## 2024-09-18 PROCEDURE — 3077F SYST BP >= 140 MM HG: CPT | Performed by: INTERNAL MEDICINE

## 2024-09-18 PROCEDURE — 99204 OFFICE O/P NEW MOD 45 MIN: CPT | Performed by: INTERNAL MEDICINE

## 2024-09-18 PROCEDURE — 1159F MED LIST DOCD IN RCRD: CPT | Performed by: INTERNAL MEDICINE

## 2024-09-18 PROCEDURE — 1160F RVW MEDS BY RX/DR IN RCRD: CPT | Performed by: INTERNAL MEDICINE

## 2024-09-18 PROCEDURE — 3079F DIAST BP 80-89 MM HG: CPT | Performed by: INTERNAL MEDICINE

## 2024-09-18 NOTE — PATIENT INSTRUCTIONS
RECOMMENDATIONS  No follow up needed  Follow up calcium and vitamin D levels with Dr. Saleem    I would be glad to see you in follow up, if needed.

## 2024-09-18 NOTE — LETTER
September 24, 2024     Piotr Saleem DO  [address]  [address]  [facility] [address]    Patient: Bre Escalante   YOB: 1955   Date of Visit: 9/18/2024       Dear Dr. Piotr Saleem DO:    Thank you for referring Bre Escalante to me for evaluation. Below are my notes for this consultation.  If you have questions, please do not hesitate to call me. I look forward to following your patient along with you.       Sincerely,     El Wiggins MD      CC: No Recipients  ______________________________________________________________________________________    History Of Present Illness  Bre Escalante is a 68 y.o. female with primary hyperparathyroidism    Right inferior parathyroid adenoma, resected 7/31/24 by Dr. Fallon.   Intraoperative PTH decreased from 150 to 44 pg/ml    Hyperparathyroidism presented with pain in the hands  Denies osteoporosis  DEXA BMD normal 2/27/23  No history of kidney stones    Past Medical History  She has a past medical history of Anxiety disorder, unspecified, Eczema, Heart palpitations, High cholesterol, HL (hearing loss), Hyperparathyroidism (Multi) (09/17/2023), Meningitis ([facility]), Osteoporosis, Personal history of other diseases of the circulatory system, and Prediabetes (09/17/2023).    Surgical History  She has a past surgical history that includes Hysteroscopy (06/19/2013); Breast lumpectomy (08/04/2014); Other surgical history (08/04/2014); Tonsillectomy (08/04/2014); Knee surgery (08/04/2014); Back surgery; Parathyroidectomy; and Cataract extraction, bilateral.     Social History  She reports that she has quit smoking. Her smoking use included cigarettes. She has never used smokeless tobacco. She reports current alcohol use of about 3.0 standard drinks of alcohol per week. She reports that she does not use drugs.    Family History  Family History   Problem Relation Name Age of Onset   • Other (PACU) Mother          after   • Diabetes Mother     • Hypertension  Mother     • Melanoma Mother     • Heart disease Mother     • Asthma Mother     • Hypertension Father     • Melanoma Father     • Melanoma Sister     • Melanoma Sister     • Melanoma Brother     • Melanoma Brother         Medications  Current Outpatient Medications   Medication Instructions   • acetaminophen (TylenoL) 325 mg tablet 120 tablets, oral, Every 4 hours   • ALPRAZolam (XANAX) 0.5 mg, oral, Daily PRN   • atorvastatin (LIPITOR) 80 mg, oral, Daily   • calcium carbonate-vitamin D3 (Oscal-500) 500 mg-10 mcg (400 unit) tablet 1 capsule, oral, Daily, For 30 days    • cyclobenzaprine (FLEXERIL) 10 mg, oral, 2 times daily PRN   • ibuprofen 200 mg tablet 1-4 tablets, oral, 3 times daily PRN, with food or milk   • losartan (COZAAR) 50 mg, oral, Daily   • pantoprazole (PROTONIX) 40 mg, oral, Daily   • valACYclovir (VALTREX) 500 mg, oral, Daily   • zolpidem (AMBIEN) 5 mg, oral, Nightly PRN       Allergies  Codeine, Doxycycline, and Erythromycin    Review of Systems   Constitutional:  Negative for fatigue, fever and unexpected weight change.   HENT:  Negative for trouble swallowing.    Eyes:  Negative for visual disturbance.   Respiratory:  Negative for shortness of breath.    Cardiovascular:  Negative for chest pain and palpitations.   Gastrointestinal:  Negative for abdominal pain, constipation, diarrhea, nausea and vomiting.   Endocrine: Negative for cold intolerance and heat intolerance.   Musculoskeletal:  Negative for neck pain.   Skin:  Negative for rash.   Neurological:  Negative for tremors, weakness and headaches.   Psychiatric/Behavioral:  The patient is not nervous/anxious.          Last Recorded Vitals  Blood pressure 145/85, pulse 69, weight 80.7 kg (178 lb).    Physical Exam  Constitutional:       General: She is not in acute distress.  HENT:      Head: Normocephalic.      Mouth/Throat:      Mouth: Mucous membranes are moist.   Eyes:      Extraocular Movements: Extraocular movements intact.   Neck:       Thyroid: No thyroid mass or thyromegaly.      Comments: Anterior neck scar  Cardiovascular:      Pulses:           Radial pulses are 2+ on the right side and 2+ on the left side.   Musculoskeletal:      Right lower leg: No edema.      Left lower leg: No edema.   Lymphadenopathy:      Cervical: No cervical adenopathy.   Neurological:      Mental Status: She is alert.      Motor: No tremor.   Psychiatric:         Mood and Affect: Affect normal.          Relevant Results  Lab Results   Component Value Date    TSH 1.07 09/13/2024      Latest Reference Range & Units 09/13/24 07:38   GLUCOSE 74 - 99 mg/dL 91   SODIUM 136 - 145 mmol/L 140   POTASSIUM 3.5 - 5.3 mmol/L 4.0   CHLORIDE 98 - 107 mmol/L 104   Bicarbonate 21 - 32 mmol/L 27   Anion Gap 10 - 20 mmol/L 13   Blood Urea Nitrogen 6 - 23 mg/dL 14   Creatinine 0.50 - 1.05 mg/dL 0.82   EGFR >60 mL/min/1.73m*2 79   Calcium 8.6 - 10.3 mg/dL 8.9   Albumin 3.4 - 5.0 g/dL 4.1   Alkaline Phosphatase 33 - 136 U/L 56   ALT 7 - 45 U/L 14   AST 9 - 39 U/L 14   Bilirubin Total 0.0 - 1.2 mg/dL 0.4      Latest Reference Range & Units 08/16/23 11:12   Calcium 8.6 - 10.6 mg/dL 9.3   PHOSPHORUS 2.5 - 4.9 mg/dL 2.9   Albumin 3.4 - 5.0 g/dL 4.2   Parathyroid Hormone, Intact 18.5 - 88.0 pg/mL 67.3       IMPRESSION  PRIMARY HYPERPARATHYROIDISM, RESOLVED  Hyperparathyroidism, resolved postoperatively      RECOMMENDATIONS  No follow up needed  Follow up calcium and vitamin D levels with Dr. Saleem    I would be glad to see you in follow up, if needed.

## 2024-09-18 NOTE — PROGRESS NOTES
History Of Present Illness  Bre Escalante is a 68 y.o. female with primary hyperparathyroidism    Right inferior parathyroid adenoma, resected 7/31/24 by Dr. Fallon.   Intraoperative PTH decreased from 150 to 44 pg/ml    Hyperparathyroidism presented with pain in the hands  Denies osteoporosis  DEXA BMD normal 2/27/23  No history of kidney stones    Past Medical History  She has a past medical history of Anxiety disorder, unspecified, Eczema, Heart palpitations, High cholesterol, HL (hearing loss), Hyperparathyroidism (Multi) (09/17/2023), Meningitis ([facility]), Osteoporosis, Personal history of other diseases of the circulatory system, and Prediabetes (09/17/2023).    Surgical History  She has a past surgical history that includes Hysteroscopy (06/19/2013); Breast lumpectomy (08/04/2014); Other surgical history (08/04/2014); Tonsillectomy (08/04/2014); Knee surgery (08/04/2014); Back surgery; Parathyroidectomy; and Cataract extraction, bilateral.     Social History  She reports that she has quit smoking. Her smoking use included cigarettes. She has never used smokeless tobacco. She reports current alcohol use of about 3.0 standard drinks of alcohol per week. She reports that she does not use drugs.    Family History  Family History   Problem Relation Name Age of Onset    Other (PACU) Mother          after    Diabetes Mother      Hypertension Mother      Melanoma Mother      Heart disease Mother      Asthma Mother      Hypertension Father      Melanoma Father      Melanoma Sister      Melanoma Sister      Melanoma Brother      Melanoma Brother         Medications  Current Outpatient Medications   Medication Instructions    acetaminophen (TylenoL) 325 mg tablet 120 tablets, oral, Every 4 hours    ALPRAZolam (XANAX) 0.5 mg, oral, Daily PRN    atorvastatin (LIPITOR) 80 mg, oral, Daily    calcium carbonate-vitamin D3 (Oscal-500) 500 mg-10 mcg (400 unit) tablet 1 capsule, oral, Daily, For 30 days     cyclobenzaprine  (FLEXERIL) 10 mg, oral, 2 times daily PRN    ibuprofen 200 mg tablet 1-4 tablets, oral, 3 times daily PRN, with food or milk    losartan (COZAAR) 50 mg, oral, Daily    pantoprazole (PROTONIX) 40 mg, oral, Daily    valACYclovir (VALTREX) 500 mg, oral, Daily    zolpidem (AMBIEN) 5 mg, oral, Nightly PRN       Allergies  Codeine, Doxycycline, and Erythromycin    Review of Systems   Constitutional:  Negative for fatigue, fever and unexpected weight change.   HENT:  Negative for trouble swallowing.    Eyes:  Negative for visual disturbance.   Respiratory:  Negative for shortness of breath.    Cardiovascular:  Negative for chest pain and palpitations.   Gastrointestinal:  Negative for abdominal pain, constipation, diarrhea, nausea and vomiting.   Endocrine: Negative for cold intolerance and heat intolerance.   Musculoskeletal:  Negative for neck pain.   Skin:  Negative for rash.   Neurological:  Negative for tremors, weakness and headaches.   Psychiatric/Behavioral:  The patient is not nervous/anxious.          Last Recorded Vitals  Blood pressure 145/85, pulse 69, weight 80.7 kg (178 lb).    Physical Exam  Constitutional:       General: She is not in acute distress.  HENT:      Head: Normocephalic.      Mouth/Throat:      Mouth: Mucous membranes are moist.   Eyes:      Extraocular Movements: Extraocular movements intact.   Neck:      Thyroid: No thyroid mass or thyromegaly.      Comments: Anterior neck scar  Cardiovascular:      Pulses:           Radial pulses are 2+ on the right side and 2+ on the left side.   Musculoskeletal:      Right lower leg: No edema.      Left lower leg: No edema.   Lymphadenopathy:      Cervical: No cervical adenopathy.   Neurological:      Mental Status: She is alert.      Motor: No tremor.   Psychiatric:         Mood and Affect: Affect normal.          Relevant Results  Lab Results   Component Value Date    TSH 1.07 09/13/2024      Latest Reference Range & Units 09/13/24 07:38   GLUCOSE 74 -  99 mg/dL 91   SODIUM 136 - 145 mmol/L 140   POTASSIUM 3.5 - 5.3 mmol/L 4.0   CHLORIDE 98 - 107 mmol/L 104   Bicarbonate 21 - 32 mmol/L 27   Anion Gap 10 - 20 mmol/L 13   Blood Urea Nitrogen 6 - 23 mg/dL 14   Creatinine 0.50 - 1.05 mg/dL 0.82   EGFR >60 mL/min/1.73m*2 79   Calcium 8.6 - 10.3 mg/dL 8.9   Albumin 3.4 - 5.0 g/dL 4.1   Alkaline Phosphatase 33 - 136 U/L 56   ALT 7 - 45 U/L 14   AST 9 - 39 U/L 14   Bilirubin Total 0.0 - 1.2 mg/dL 0.4      Latest Reference Range & Units 08/16/23 11:12   Calcium 8.6 - 10.6 mg/dL 9.3   PHOSPHORUS 2.5 - 4.9 mg/dL 2.9   Albumin 3.4 - 5.0 g/dL 4.2   Parathyroid Hormone, Intact 18.5 - 88.0 pg/mL 67.3       IMPRESSION  PRIMARY HYPERPARATHYROIDISM, RESOLVED  Hyperparathyroidism, resolved postoperatively      RECOMMENDATIONS  No follow up needed  Follow up calcium and vitamin D levels with Dr. Saleem    I would be glad to see you in follow up, if needed.

## 2024-09-24 ASSESSMENT — ENCOUNTER SYMPTOMS
VOMITING: 0
NERVOUS/ANXIOUS: 0
CONSTIPATION: 0
FATIGUE: 0
UNEXPECTED WEIGHT CHANGE: 0
SHORTNESS OF BREATH: 0
TROUBLE SWALLOWING: 0
ABDOMINAL PAIN: 0
TREMORS: 0
NAUSEA: 0
FEVER: 0
NECK PAIN: 0
PALPITATIONS: 0
DIARRHEA: 0
WEAKNESS: 0
HEADACHES: 0

## 2024-10-18 ENCOUNTER — APPOINTMENT (OUTPATIENT)
Dept: RADIOLOGY | Facility: HOSPITAL | Age: 69
End: 2024-10-18
Payer: COMMERCIAL

## 2024-10-18 ENCOUNTER — HOSPITAL ENCOUNTER (EMERGENCY)
Facility: HOSPITAL | Age: 69
Discharge: HOME | End: 2024-10-18
Payer: COMMERCIAL

## 2024-10-18 VITALS
DIASTOLIC BLOOD PRESSURE: 76 MMHG | BODY MASS INDEX: 29.23 KG/M2 | HEIGHT: 63 IN | OXYGEN SATURATION: 99 % | WEIGHT: 165 LBS | HEART RATE: 64 BPM | SYSTOLIC BLOOD PRESSURE: 133 MMHG | TEMPERATURE: 97.2 F | RESPIRATION RATE: 18 BRPM

## 2024-10-18 DIAGNOSIS — M25.50 ARTHRALGIA, UNSPECIFIED JOINT: Primary | ICD-10-CM

## 2024-10-18 PROCEDURE — 72125 CT NECK SPINE W/O DYE: CPT | Performed by: RADIOLOGY

## 2024-10-18 PROCEDURE — 71250 CT THORAX DX C-: CPT | Performed by: RADIOLOGY

## 2024-10-18 PROCEDURE — 71250 CT THORAX DX C-: CPT

## 2024-10-18 PROCEDURE — 72125 CT NECK SPINE W/O DYE: CPT

## 2024-10-18 PROCEDURE — 73000 X-RAY EXAM OF COLLAR BONE: CPT | Mod: RIGHT SIDE | Performed by: RADIOLOGY

## 2024-10-18 PROCEDURE — 99285 EMERGENCY DEPT VISIT HI MDM: CPT | Mod: 25

## 2024-10-18 PROCEDURE — 73000 X-RAY EXAM OF COLLAR BONE: CPT | Mod: RT

## 2024-10-18 RX ORDER — MELOXICAM 7.5 MG/1
7.5 TABLET ORAL 2 TIMES DAILY
Qty: 20 TABLET | Refills: 0 | Status: SHIPPED | OUTPATIENT
Start: 2024-10-18 | End: 2024-10-28

## 2024-10-18 ASSESSMENT — PAIN DESCRIPTION - PAIN TYPE: TYPE: ACUTE PAIN

## 2024-10-18 ASSESSMENT — PAIN DESCRIPTION - ORIENTATION: ORIENTATION: RIGHT

## 2024-10-18 ASSESSMENT — LIFESTYLE VARIABLES
HAVE PEOPLE ANNOYED YOU BY CRITICIZING YOUR DRINKING: NO
EVER FELT BAD OR GUILTY ABOUT YOUR DRINKING: NO
TOTAL SCORE: 0
EVER HAD A DRINK FIRST THING IN THE MORNING TO STEADY YOUR NERVES TO GET RID OF A HANGOVER: NO
HAVE YOU EVER FELT YOU SHOULD CUT DOWN ON YOUR DRINKING: NO

## 2024-10-18 ASSESSMENT — COLUMBIA-SUICIDE SEVERITY RATING SCALE - C-SSRS
6. HAVE YOU EVER DONE ANYTHING, STARTED TO DO ANYTHING, OR PREPARED TO DO ANYTHING TO END YOUR LIFE?: NO
2. HAVE YOU ACTUALLY HAD ANY THOUGHTS OF KILLING YOURSELF?: NO
1. IN THE PAST MONTH, HAVE YOU WISHED YOU WERE DEAD OR WISHED YOU COULD GO TO SLEEP AND NOT WAKE UP?: NO

## 2024-10-18 ASSESSMENT — PAIN DESCRIPTION - DIRECTION: RADIATING_TOWARDS: CLAVICLE

## 2024-10-18 ASSESSMENT — PAIN SCALES - GENERAL: PAINLEVEL_OUTOF10: 7

## 2024-10-18 ASSESSMENT — PAIN - FUNCTIONAL ASSESSMENT: PAIN_FUNCTIONAL_ASSESSMENT: 0-10

## 2024-10-19 NOTE — ED PROVIDER NOTES
HPI   Chief Complaint   Patient presents with    Collarbone Injury     Pt c/o rt clavicle pain and swelling. No injuries noted. Pt has swelling over the bone.       History of present illness:  69-year-old female presents to the emergency room for complaints of pain and swelling at her collarbone on the right side connecting to her chest.  She states that she woke up this morning and noticed pain just over the area and some swelling.  She states it hurts especially when she moves her right arm up and overhead or in front of her.  She states the pain is also exacerbated when she moves her arm across her chest.  She denies any trauma or injuries to her chest and denies any prior surgeries.  She states she does not recall any other symptoms at this time.  She states that she takes medications for high cholesterol and hypertension.  She denies any other medical history.    Social history: Negative for alcohol and drug use.    Review of systems:   Gen.: No weight loss, fatigue, anorexia, insomnia, fever.   Eyes: No vision loss, double vision, drainage, eye pain.   ENT: No pharyngitis, neck pain, headache  Cardiac: No palpitations, syncope, near syncope.   Pulmonary: No shortness of breath, cough, hemoptysis.   Heme/lymph: No swollen glands, fever, bleeding.   GI: No abdominal pain, change in bowel habits, melena, hematemesis, hematochezia, nausea, vomiting, diarrhea.   : No discharge, dysuria, frequency, urgency, hematuria.   Musculoskeletal: No limb pain, joint pain, joint swelling.   Skin: No rashes.   Review of systems is otherwise negative unless stated above or in history of present illness.      Physical exam:  General: Vitals noted, no distress. Afebrile.   EENT: No pain to palpation of the cervical spine, no step-off appreciated  Cardiac: Regular, rate, rhythm, no murmur.   Chest: There appears to be some swelling present over the proximal collarbone connecting to the sternum at this time, it is swollen and  tender to the touch but there is no obvious bruising present there is no pain to palpation across the ribs underneath this at this time, the pain is easily exacerbated with movement of the arm above the patient's head or across to her chest.  Pulmonary: Lungs clear bilaterally with good aeration. No adventitious breath sounds.   Abdomen: Soft, nonsurgical. Nontender. No peritoneal signs. Normoactive bowel sounds.   Extremities: No peripheral edema.   Skin: No rash.   Neuro: No focal neurologic deficits        Medical decision making:   Testing: CT scan of neck and CT scan of chest without contrast shows inflammatory changes and soft tissue swelling at the proximal portion of the right collarbone but no other acute findings as interpreted by radiology  Plan: Home-going.  Discussed differential. Will follow-up with the primary physician in the next 2-3 days. Return if worse. They understand return precautions and discharge instructions. Patient and family/friend/caregiver are in agreement with this plan. 69-year-old female presents to the emergency room for complaints of pain and swelling at her collarbone on the right side connecting to her chest.  She states that she woke up this morning and noticed pain just over the area and some swelling.  She states it hurts especially when she moves her right arm up and overhead or in front of her.  She states the pain is also exacerbated when she moves her arm across her chest.  She denies any trauma or injuries to her chest and denies any prior surgeries.  She states she does not recall any other symptoms at this time.  She states that she takes medications for high cholesterol and hypertension.  She denies any other medical history. Chest: There appears to be some swelling present over the proximal collarbone connecting to the sternum at this time, it is swollen and tender to the touch but there is no obvious bruising present there is no pain to palpation across the ribs  underneath this at this time, the pain is easily exacerbated with movement of the arm above the patient's head or across to her chest.  I explained to the patient the test results at this time and explained to her be sending her home short course of meloxicam to fully alleviate her symptoms.  I encouraged her to please follow-up with her primary care doctor and I also gave her referral to orthopedics at this time.  Impression:   1.  Arthralgia            History provided by:  Patient   used: No            Patient History   Past Medical History:   Diagnosis Date    Anxiety disorder, unspecified     Anxiety    Eczema     Heart palpitations     High cholesterol     HL (hearing loss)     Hyperparathyroidism (Multi) 09/17/2023    Meningitis ([facility])     Osteoporosis     Personal history of other diseases of the circulatory system     History of hypertension    Prediabetes 09/17/2023     Past Surgical History:   Procedure Laterality Date    BACK SURGERY      Laminectomy    BREAST LUMPECTOMY  08/04/2014    Breast Surgery Lumpectomy    CATARACT EXTRACTION, BILATERAL      HYSTEROSCOPY  06/19/2013    Hysteroscopy With Endometrial Ablation    KNEE SURGERY  08/04/2014    Knee Surgery    OTHER SURGICAL HISTORY  08/04/2014    Elbow Surgery Excision    PARATHYROIDECTOMY      TONSILLECTOMY  08/04/2014    Tonsillectomy     Family History   Problem Relation Name Age of Onset    Other (PACU) Mother          after    Diabetes Mother      Hypertension Mother      Melanoma Mother      Heart disease Mother      Asthma Mother      Hypertension Father      Melanoma Father      Melanoma Sister      Melanoma Sister      Melanoma Brother      Melanoma Brother       Social History     Tobacco Use    Smoking status: Former     Types: Cigarettes    Smokeless tobacco: Never   Vaping Use    Vaping status: Every Day    Substances: Nicotine    Devices: Refillable tank   Substance Use Topics    Alcohol use: Yes     Alcohol/week:  3.0 standard drinks of alcohol     Types: 3 Standard drinks or equivalent per week     Comment: occ    Drug use: Never       Physical Exam   ED Triage Vitals [10/18/24 1842]   Temperature Heart Rate Respirations BP   36.7 °C (98.1 °F) 70 18 148/85      Pulse Ox Temp src Heart Rate Source Patient Position   98 % -- -- --      BP Location FiO2 (%)     -- --       Physical Exam      ED Course & MDM   Diagnoses as of 10/19/24 0010   Arthralgia, unspecified joint                 No data recorded     Wideman Coma Scale Score: 15 (10/18/24 1844 : Amber Rausch, ENOC)                           Medical Decision Making      Procedure  Procedures     Campbell Boggs PA-C  10/19/24 0015

## 2024-10-22 ENCOUNTER — APPOINTMENT (OUTPATIENT)
Dept: PRIMARY CARE | Facility: CLINIC | Age: 69
End: 2024-10-22
Payer: COMMERCIAL

## 2024-12-20 ENCOUNTER — PATIENT MESSAGE (OUTPATIENT)
Dept: PRIMARY CARE | Facility: CLINIC | Age: 69
End: 2024-12-20
Payer: COMMERCIAL

## 2024-12-20 DIAGNOSIS — G47.00 INSOMNIA, UNSPECIFIED TYPE: ICD-10-CM

## 2024-12-23 ENCOUNTER — APPOINTMENT (OUTPATIENT)
Dept: PRIMARY CARE | Facility: CLINIC | Age: 69
End: 2024-12-23
Payer: COMMERCIAL

## 2025-01-02 ENCOUNTER — APPOINTMENT (OUTPATIENT)
Dept: PRIMARY CARE | Facility: CLINIC | Age: 70
End: 2025-01-02
Payer: COMMERCIAL

## 2025-01-03 RX ORDER — ZOLPIDEM TARTRATE 5 MG/1
5 TABLET ORAL NIGHTLY PRN
Qty: 90 TABLET | Refills: 0 | Status: SHIPPED | OUTPATIENT
Start: 2025-01-03 | End: 2025-04-03

## 2025-01-23 ENCOUNTER — APPOINTMENT (OUTPATIENT)
Dept: PRIMARY CARE | Facility: CLINIC | Age: 70
End: 2025-01-23
Payer: COMMERCIAL

## 2025-01-28 ENCOUNTER — APPOINTMENT (OUTPATIENT)
Dept: PRIMARY CARE | Facility: CLINIC | Age: 70
End: 2025-01-28

## 2025-03-04 ENCOUNTER — TELEPHONE (OUTPATIENT)
Dept: ORTHOPEDIC SURGERY | Facility: CLINIC | Age: 70
End: 2025-03-04
Payer: MEDICARE

## 2025-03-04 NOTE — TELEPHONE ENCOUNTER
Left a message for Bre to call the office regarding her appointment tomorrow, she will need to reschedule as  is unavailable.   Home

## 2025-03-05 ENCOUNTER — APPOINTMENT (OUTPATIENT)
Dept: ORTHOPEDIC SURGERY | Facility: CLINIC | Age: 70
End: 2025-03-05

## 2025-03-08 ENCOUNTER — OFFICE VISIT (OUTPATIENT)
Dept: OTOLARYNGOLOGY | Facility: CLINIC | Age: 70
End: 2025-03-08
Payer: MEDICARE

## 2025-03-08 VITALS — WEIGHT: 165 LBS | HEIGHT: 63 IN | BODY MASS INDEX: 29.23 KG/M2

## 2025-03-08 DIAGNOSIS — Z53.21 PATIENT LEFT WITHOUT BEING SEEN: Primary | ICD-10-CM

## 2025-03-08 PROCEDURE — 3008F BODY MASS INDEX DOCD: CPT | Performed by: OTOLARYNGOLOGY

## 2025-03-08 PROCEDURE — 1160F RVW MEDS BY RX/DR IN RCRD: CPT | Performed by: OTOLARYNGOLOGY

## 2025-03-08 PROCEDURE — 1036F TOBACCO NON-USER: CPT | Performed by: OTOLARYNGOLOGY

## 2025-03-08 PROCEDURE — 1159F MED LIST DOCD IN RCRD: CPT | Performed by: OTOLARYNGOLOGY

## 2025-03-12 ENCOUNTER — HOSPITAL ENCOUNTER (OUTPATIENT)
Dept: RADIOLOGY | Facility: HOSPITAL | Age: 70
Discharge: HOME | End: 2025-03-12
Payer: MEDICARE

## 2025-03-12 ENCOUNTER — APPOINTMENT (OUTPATIENT)
Dept: ORTHOPEDIC SURGERY | Facility: CLINIC | Age: 70
End: 2025-03-12
Payer: MEDICARE

## 2025-03-12 VITALS — BODY MASS INDEX: 24.75 KG/M2 | WEIGHT: 145 LBS | HEIGHT: 64 IN

## 2025-03-12 DIAGNOSIS — M25.561 RIGHT KNEE PAIN, UNSPECIFIED CHRONICITY: ICD-10-CM

## 2025-03-12 DIAGNOSIS — M94.261 CHONDROMALACIA, KNEE, RIGHT: ICD-10-CM

## 2025-03-12 DIAGNOSIS — S83.241A ACUTE MEDIAL MENISCUS TEAR OF RIGHT KNEE, INITIAL ENCOUNTER: ICD-10-CM

## 2025-03-12 DIAGNOSIS — M25.561 PAIN OF RIGHT PATELLOFEMORAL JOINT: ICD-10-CM

## 2025-03-12 DIAGNOSIS — M17.11 ARTHRITIS OF RIGHT KNEE: Primary | ICD-10-CM

## 2025-03-12 PROCEDURE — 3008F BODY MASS INDEX DOCD: CPT | Performed by: ORTHOPAEDIC SURGERY

## 2025-03-12 PROCEDURE — 73564 X-RAY EXAM KNEE 4 OR MORE: CPT | Mod: RT

## 2025-03-12 PROCEDURE — 1159F MED LIST DOCD IN RCRD: CPT | Performed by: ORTHOPAEDIC SURGERY

## 2025-03-12 PROCEDURE — 73564 X-RAY EXAM KNEE 4 OR MORE: CPT | Mod: RIGHT SIDE | Performed by: RADIOLOGY

## 2025-03-12 PROCEDURE — 99204 OFFICE O/P NEW MOD 45 MIN: CPT | Performed by: ORTHOPAEDIC SURGERY

## 2025-03-12 PROCEDURE — 1036F TOBACCO NON-USER: CPT | Performed by: ORTHOPAEDIC SURGERY

## 2025-03-12 PROCEDURE — 1160F RVW MEDS BY RX/DR IN RCRD: CPT | Performed by: ORTHOPAEDIC SURGERY

## 2025-03-12 RX ORDER — NAPROXEN 500 MG/1
500 TABLET ORAL 2 TIMES DAILY PRN
Qty: 60 TABLET | Refills: 0 | Status: SHIPPED | OUTPATIENT
Start: 2025-03-12 | End: 2025-04-11

## 2025-03-12 ASSESSMENT — PAIN SCALES - GENERAL: PAINLEVEL_OUTOF10: 5 - MODERATE PAIN

## 2025-03-12 ASSESSMENT — PAIN - FUNCTIONAL ASSESSMENT: PAIN_FUNCTIONAL_ASSESSMENT: 0-10

## 2025-03-12 NOTE — PROGRESS NOTES
69-year-old female with history of right knee scope 15 years ago presents with 1 year of right medial knee pain.  Patient states the pains been getting worse.  She had a twisting injury 1 year ago but then also reinjured the knee a few weeks ago then pains been getting worse.  She been treating it with ice heat anti-inflammatories brace.  Pain is worse with range of motion better with rest    Patients' self reported past medical history, medications, allergies, surgical history, family and social history as well as a 10 point review of systems has been documented in the new patient intake form and scanned into the patient's electronic medical record.  The intake form was reviewed by Dr Pérez during the office visit and signed by Dr. Pérez and the patient.  Pertinent findings are documented in the HPI.    General Multi-System Physical Exam:  Constitutional  General appearance:  Alert, oriented, and in no acute distress.  Well developed, well nourished.  Head and Face  Head and face:  Normocephalic and atraumatic.  Ears, Nose, Mouth, and Throat  External inspection of ears and nose: Normal.  Eyes:  Pupils are equal and round.  Neck  Neck:  no neck mass was observed.  Pulmonary  Respiratory effort:  no respiratory distress.  Cardiovascular  Intact distal pulses.  Lymphatic  Palpation of lymph nodes in the affected extremity:  Normal.  Skin  Skin and subcutaneous tissue:  Normal skin color and pigmentation.  Normal skin turgor.  No rashes.  Neurologic  Sensation:  normal to light touch.  Psychiatric  Judgement and insight:  Intact.  Mood and affect:  Normal.  Musculoskeletal  Right knee range of motion is from 0 degrees of extension to 130 degrees of flexion positive medial joint line tenderness with positive Maki's positive medial proximal tibial tenderness no lateral joint line tenderness.  Patient has a negative Lockman exam, negative anterior and negative posterior drawer. The knee is stable to varus and valgus  "stress without pain. Patient is neurovascularly intact in the bilateral lower extremities.    X-rays of the patient were ordered by Dr Pérez and obtained today.  Dr Pérez personally reviewed the results of the x-rays.    In addition, Dr Pérez independently interpreted the patient's x-rays (performed by the Radiology department) by viewing the x-ray images and this is Dr. Pérez's personal interpretation:     X-rays right knee show mild medial compartment arthritis    We had a long discussion in regards to the patient's knee pain.  We discussed knee arthritis as well as meniscus tears today.      Nonoperative and operative therapies for knee arthritis include weight loss, physical therapy, anti-inflammatory medications, steroid injections, viscosupplementation injections, bracing, walking aids such as a cane, knee arthroscopy and total knee replacement.  Knee arthritis is a progressive disease and while we cannot reverse the stages of arthritis, we hope to make the patient more comfortable.       The patient's height and weight were documented today in the vitals tab in the patient's EPIC chart, and the patient's BMI was calculated.  A follow up plan was then developed by Dr. Pérez, per  mandated guidelines, based upon the patient's BMI and the follow up plan was documented in the \"Patient Instructions\" section of the chart.  Weight loss can be achieved by decreasing the amount of calories consumed daily and by increasing daily physical activity.  We recommend finding physical activities that you can participate in regularly and you enjoy which do not worsen your current joint pains.       We talked about weight loss.  When the patient walks, especially going up and down stairs, their weight is magnified on their knee up to 5 times.  For that reason, 20 pounds of extra weight feels like 100 pounds of extra weight on the knees.  Excess weight is a well-known source of knee pain and losing weight can specifically " "help reduce the knee pain.  [facility] offers multiple ways to help the patient with weight loss including nutritionists and a bariatric department that would be more than happy to see and evaluate the patient if they had trouble with their weight and would like to lose weight.    If a meniscal tear is going to heal, it usually does so within the first 4 weeks.  The patient is usually aware that the meniscus has healed due to the fact that the pain resolves.  Unfortunately, due to the poor vascular supply of the meniscus, there is only about a 20% chance that a meniscal tear will heal on its own.  Possible treatment options for meniscus tears include nonoperative conservative therapy with anti-inflammatory medications, physical therapy, and sometimes steroid injections into the knee versus knee arthroscopy and meniscus debridement versus meniscus repair.      We will start off by treating the patient's knee conservatively (AKA non-operatively).  We gave the patient a prescription for physical therapy to work on increasing the range of motion and strength in the knee.  We also gave the patient a \"home exercise protocol\" list of exercises that they could work on to strengthen their knee at home.  We also called in a prescription for anti-inflammatories for the patient.  The patient was informed that there are rare risks of using nonsteroidal antiinflammatory (NSAID) medications.  Risks of NSAIDS include, but are not limited to, upset stomach, ulcers in the stomach and other places in the gastrointestinal tract, and a mild increase in cardiovascular risk as a result of the antiinflammatory medications.  In addition, there is an increased risk in bleeding as a result of the medications.  The patient was advised to stop taking the NSAIDs if they cause them to have an upset stomach.  The patient was instructed to take the medication on a p.r.n. basis as needed only.  NSAIDs are not supposed to be taken every day " "for more than a few weeks.  If they have any questions or problems with the antiinflammatory medications, they should stop taking the medication immediately and call the office.    We offered the patient an injection of steroids into their knee.  I explained to the patient that there are some rare risks of steroid injections.      The patient wanted to hold off on a steroid injection in the right knee at this time.    We will see the patient back in 6-8 weeks to reevaluate their knee pain. If they are still having pain at that time, we would then need to order an MRI to look for possible meniscus tears and assess for cartilage damage in the knee.  The patient should call the office during business hours (9am-3pm; Monday - Friday)  with any questions or problems.  If the patient has any urgent issues outside of business hours, they should go to a local Emergency Room.      The patient's height and weight were documented today in the vitals tab in the patient's EPIC chart, and the patient's BMI was calculated.  A follow up plan was then developed by Dr. Pérez, per  mandated guidelines, based upon the patient's BMI and the follow up plan was documented in the \"Patient Instructions\" section of the chart.  Weight loss can be achieved by decreasing the amount of calories consumed daily and by increasing daily physical activity.  We recommend finding physical activities that you can participate in regularly and you enjoy which do not worsen your current joint pains.       This patient has a new, acute, previously-undiagnosed problem of their affected extremity.  We will begin treatment as listed here and monitor treatment based upon their progression and response to treatment.  Due to the fact that we are just beginning treatment on this issue, this is currently considered an undiagnosed new problem with uncertain prognosis.  The exact diagnosis and their prognosis will depend upon their response to treatment and " progression of their condition as time progresses.    Due to this patient's condition, they are at a moderate risk of morbidity from additional diagnostic testing / treatment.      To help them with their pain, I wrote them a prescription for prescription strength anti-inflammatories.  The patient was informed that there are risks of using nonsteroidal antiinflammatory (NSAID) medications.    Risks of NSAIDS include, but are not limited to, upset stomach, ulcers in the stomach and other places in the gastrointestinal tract, and a mild increase in cardiovascular risk as a result of the antiinflammatory medications.  In addition, there is an increased risk in bleeding as a result of the medications.    The patient was advised to stop taking the NSAIDs if they cause them to have an upset stomach.  NSAIDs are not supposed to be taken every day for more than a few weeks.  If they have any questions or problems with the antiinflammatory medications, they should stop taking the medication immediately and call the office.

## 2025-03-17 ENCOUNTER — APPOINTMENT (OUTPATIENT)
Dept: PRIMARY CARE | Facility: CLINIC | Age: 70
End: 2025-03-17
Payer: MEDICARE

## 2025-03-20 DIAGNOSIS — E11.9 TYPE 2 DIABETES MELLITUS WITHOUT COMPLICATION, WITHOUT LONG-TERM CURRENT USE OF INSULIN (MULTI): ICD-10-CM

## 2025-03-20 RX ORDER — LOSARTAN POTASSIUM 50 MG/1
50 TABLET ORAL DAILY
Qty: 90 TABLET | Refills: 0 | Status: SHIPPED | OUTPATIENT
Start: 2025-03-20

## 2025-04-07 ENCOUNTER — APPOINTMENT (OUTPATIENT)
Dept: INFECTIOUS DISEASES | Facility: CLINIC | Age: 70
End: 2025-04-07
Payer: MEDICARE

## 2025-04-07 VITALS
WEIGHT: 157 LBS | OXYGEN SATURATION: 96 % | SYSTOLIC BLOOD PRESSURE: 120 MMHG | HEART RATE: 60 BPM | BODY MASS INDEX: 26.95 KG/M2 | TEMPERATURE: 97.6 F | DIASTOLIC BLOOD PRESSURE: 64 MMHG

## 2025-04-07 DIAGNOSIS — Z71.84 COUNSELING FOR TRAVEL: Primary | ICD-10-CM

## 2025-04-07 DIAGNOSIS — Z91.89 AT RISK FOR NAUSEA: ICD-10-CM

## 2025-04-07 PROCEDURE — 90691 TYPHOID VACCINE IM: CPT | Performed by: STUDENT IN AN ORGANIZED HEALTH CARE EDUCATION/TRAINING PROGRAM

## 2025-04-07 PROCEDURE — 90472U01 HEPATITIS A VACCINE ADULT IM: Performed by: STUDENT IN AN ORGANIZED HEALTH CARE EDUCATION/TRAINING PROGRAM

## 2025-04-07 PROCEDURE — 99211U01 TRAVEL NURSE VISIT (U01): Performed by: STUDENT IN AN ORGANIZED HEALTH CARE EDUCATION/TRAINING PROGRAM

## 2025-04-07 PROCEDURE — 90471U01 TYPHOID VICPS VACCINE IM: Performed by: STUDENT IN AN ORGANIZED HEALTH CARE EDUCATION/TRAINING PROGRAM

## 2025-04-07 PROCEDURE — 90632 HEPA VACCINE ADULT IM: CPT | Performed by: STUDENT IN AN ORGANIZED HEALTH CARE EDUCATION/TRAINING PROGRAM

## 2025-04-07 RX ORDER — ATOVAQUONE AND PROGUANIL HYDROCHLORIDE 250; 100 MG/1; MG/1
1 TABLET, FILM COATED ORAL DAILY
Qty: 32 TABLET | Refills: 0 | Status: SHIPPED | OUTPATIENT
Start: 2025-04-07 | End: 2025-05-09

## 2025-04-07 RX ORDER — ONDANSETRON 4 MG/1
4 TABLET, ORALLY DISINTEGRATING ORAL EVERY 8 HOURS PRN
Qty: 20 TABLET | Refills: 0 | Status: SHIPPED | OUTPATIENT
Start: 2025-04-07 | End: 2025-04-14

## 2025-04-07 RX ORDER — CIPROFLOXACIN 500 MG/1
500 TABLET ORAL 2 TIMES DAILY
Qty: 6 TABLET | Refills: 0 | Status: SHIPPED | OUTPATIENT
Start: 2025-04-07 | End: 2025-04-10

## 2025-04-07 NOTE — PROGRESS NOTES
Part of a group of five traveling to [place] from 5/09-5/31/25.  Tour company is requiring YF vaccine. Already has YF vaccine. Tdap 7/2017. One HepA vaccine in 2018.     Ordered HepA and Typhoid ViCPs vaccines.    Prescribed Malarone (32 doses), cipro for Traveler's diarrhea, and Zofran (pt requested) for nausea.    Discussed food, water, and insect precautions.

## 2025-04-07 NOTE — PATIENT INSTRUCTIONS
You were seen today for your upcoming trip.    For your country specific issues, please refer back to the [employer] handouts supplied to you in the travel brochure folder that we provided to you at your visit.  These are updated daily, if necessary, by the reporting agencies, so are a valuable source of information for your trip.    This brief summary may not contain all of the items that we discussed today.  Please review the handouts given to you as well.    ** Please be sure to carry any medications with you in your carry-on luggage in the event that your luggage is delayed or lost during your travels.    ** For your trip, as we discussed, standard food and water precautions apply.     You should avoid drinking any water that is not bottled.  Alcoholic or carbonated beverages are safe to drink.  Any beverage that has been brought to a rolling boil for  3 minutes is also safe to drink (once it has cooled some).  Commercially purchased milk products that are boxed (may be on store shelves) or refrigerated, are pasteurized and therefore safe to drink as long as they are refrigerated after opening.  Juices that are prepared commercially (in boxes or individual bottles) are also safe to drink as they are pasteurized as well.  Avoid road-side juice vendors as the juice may be diluted with contaminated water or produced from unclean fruit.  Regarding food precautions, you want to make sure that meats are well cooked.   Avoid eating fresh fruits and vegetables raw with the skin intact.  Peel before eating.  Avoid salads due to possible contamination by contaminated water.  Avoid any unpasteurized cheeses (they typically are very white in appearance)    ** We recommend that you use insect repellant to help you prevent infections caused by biting insects such as mosquitoes, flies, ticks, fleas and chiggers.  This includes protection against Zika virus, Dengue virus, Chikungunya and Malaria, just to name a few.    For insect  "repellents that are applied directly to the skin, we recommend DEET containing repellants with a percentage between 20-40%.  Apply to skin exposed surfaces only, every 6-8 hours throughout the day.  I typically recommend applying before breakfast, lunch and dinner as these are natural breaks in your day.  Wash your hands after applying. Apply again in the evening.  You must reapply after bathing or swimming as it is washed away with water.  Apply after sunscreen products are applied. DEET containing repellants protect against all concerning insects with the exception of hornets, wasps or bees. Activity against ticks only lasts for 2 hours. For additional Tick precautions while hiking, tuck your pant legs into your socks as this will prevent ticks from crawling up your shoes / socks onto your legs while walking. Perform a \"tick check\" at least once daily, at the end of your day.  Check in the sporting goods areas of most stores for these products.  A recommended alternative, Picardin ,may also be used every 8 hours.  If you are unable to locate the product in stores, try on-line stores as well.      PERMETHRIN SPRAY  is an additional option and is for application to clothing and garments only.  This can be applied to hats, bandanas, socks, boots and any clothing items to prevent insect attention.  I can be applied before you leave and will remain active through many washes and for up to 6 weeks in unwashed clothing.  Read any packaging for full specifications. Apply in an open area as when wet, it has an odor. Once dry, it typically has no odor and does not stain clothing. Regular repellants should be applied to exposed skin however.  Permethrin may only be available on-line.     **  As a general safety procedure, we recommend that you scan a copy of your passport, any travel required documents (yellow fever vaccine card), and itinerary and email them to yourself.  Keep these in a special travel folder for reference " in the event that your travel documents are misplaced or stolen while abroad.  You should also leave a detailed copy of your itinerary with a friend or relative at home for reference as well.  If traveling for long periods, an international [employer] card or global plan for your cellular service may be beneficial for communication. This list is not meant to be all inclusive.      **  Please review and understand any cultural aspects of the countries that you will be visiting to ensure that appropriate dress and behaviors are understood.  ENJOY YOUR TRIP (o:      **  Make sure to come back to complete any vaccine series that may have been started today.  This will ensure full vaccine efficacy and protection for future travel.    Today, the Hepatitis A vaccine, which will protect you from Hepatitis A for your upcoming trip, and the Typhoid vaccine, which is good for two years.  I sent prescriptions for malaria prevention medicine, ciprofloxacin for Traveler's diarrhea, and ondansetron for nausea to your pharmacy.  Have a great trip!

## 2025-04-23 ENCOUNTER — APPOINTMENT (OUTPATIENT)
Dept: ORTHOPEDIC SURGERY | Facility: CLINIC | Age: 70
End: 2025-04-23
Payer: MEDICARE

## 2025-04-23 ENCOUNTER — OFFICE VISIT (OUTPATIENT)
Dept: ORTHOPEDIC SURGERY | Facility: CLINIC | Age: 70
End: 2025-04-23
Payer: MEDICARE

## 2025-04-23 VITALS — HEIGHT: 63 IN | BODY MASS INDEX: 26.58 KG/M2 | WEIGHT: 150 LBS

## 2025-04-23 DIAGNOSIS — M17.11 ARTHRITIS OF RIGHT KNEE: Primary | ICD-10-CM

## 2025-04-23 DIAGNOSIS — S83.241A ACUTE MEDIAL MENISCUS TEAR OF RIGHT KNEE, INITIAL ENCOUNTER: ICD-10-CM

## 2025-04-23 RX ORDER — TRIAMCINOLONE ACETONIDE 40 MG/ML
40 INJECTION, SUSPENSION INTRA-ARTICULAR; INTRAMUSCULAR
Status: COMPLETED | OUTPATIENT
Start: 2025-04-23 | End: 2025-04-23

## 2025-04-23 RX ORDER — LIDOCAINE HYDROCHLORIDE 5 MG/ML
4 INJECTION, SOLUTION INFILTRATION; PERINEURAL
Status: COMPLETED | OUTPATIENT
Start: 2025-04-23 | End: 2025-04-23

## 2025-04-23 RX ADMIN — LIDOCAINE HYDROCHLORIDE 4 ML: 5 INJECTION, SOLUTION INFILTRATION; PERINEURAL at 10:07

## 2025-04-23 RX ADMIN — TRIAMCINOLONE ACETONIDE 40 MG: 40 INJECTION, SUSPENSION INTRA-ARTICULAR; INTRAMUSCULAR at 10:07

## 2025-04-23 ASSESSMENT — PAIN SCALES - GENERAL: PAINLEVEL_OUTOF10: 4

## 2025-04-23 ASSESSMENT — PAIN - FUNCTIONAL ASSESSMENT: PAIN_FUNCTIONAL_ASSESSMENT: 0-10

## 2025-04-23 NOTE — PROGRESS NOTES
History of Present Illness:    69 y.o. female presents to clinic today for her right knee.  Patient was last seen on 3/12/2025 with Dr. Pérez for right knee pain related to mild arthritis versus possible meniscus tear.  Patient states that she continues to have medial knee pain predominantly.  She states that its intermittent.  She notes that she has good days and bad days.  She states that she has had intermittent swelling since.  She is taking naproxen as needed for pain.  She states weightbearing activities increase her symptoms.  She does note that rest improves her pain.  She has not started physical therapy yet.  She has not had any surgeries on her right knee previously.      Review of Systems:    GENERAL: Negative  GI: Negative  MUSCULOSKELETAL: See HPI  SKIN: Negative  NEURO:  Negative        Physical Exam:  Knee:  Skin is intact. No erythema or drainage noted. No evidence of infection today.  No swelling noted.  Extensor mechanism intact.  Range of motion of right flexion knee is 0-120 degrees of flexion. Tenderness to palpation to medial joint line and proximal medial tibia.  No tenderness palpation of the lateral joint line today.  Positive Jennifer's.  Patient has a negative Lachman exam, negative anterior and negative posterior drawer. The knee is stable to varus and valgus stress without pain. Negative patellar apprehension. Patient is neurovascularly intact in the bilateral lower extremities.      Imaging:  No new imaging obtained today.    Assessment:    Right knee pain related to mild arthritis versus possible medial meniscus tear.    Plan:  Discussed further management with patient today.  At this time patient continues to have right knee pain related to mild arthritis versus possible medial meniscus tear.  Patient does get some benefit with naproxen and activity modification.  We discussed continuing with conservative treatment options like cortisone injection and physical therapy today.  The  patient states that she plans on starting physical therapy when she returns from her trip that she is leaving for in a couple of weeks.  She would like to move forward with a cortisone injection to the right knee today.  I will follow-up with the patient as needed regarding this issue.  If she completes physical therapy and continues to have right knee pain the next step would be to move forward with an MRI to evaluate for possible medial meniscus tear versus cartilage damage.  Will see her back as needed.    I explained to the patient that there are some rare risks of steroid injections.       Rare risks of steroid injections into the knee include pain at the site of the injection, local swelling, irritation from the injection or the skin spray, local discoloration of the skin, risk of bleeding, and a risk of knee infection.  If the patient does have a flareup of pain in the evening following the injection, they should ice the knee 15 minutes at a time 3 times a day for up to 3 days.  If the pain gets too severe, they should go to a local Emergency Room right away.       After understanding that there are risks and benefits of steroid injections, the patient decided to proceed with injection.  The knee was prepped sterilely with betadyne and 5 mL of 0.25% Marcaine and 1 mL of Kenalog 40 mg was injected into the knee without complications.  A bandage was applied at the site of the injection.  The patient tolerated the procedure well.        Patient ID: Bre Escalante is a 69 y.o. female.    L Inj/Asp: R knee on 4/23/2025 10:07 AM  Indications: pain  Details: 22 G needle, anterolateral approach  Medications: 40 mg triamcinolone acetonide 40 mg/mL; 4 mL lidocaine 5 mg/mL (0.5 %)  Outcome: tolerated well, no immediate complications  Procedure, treatment alternatives, risks and benefits explained, specific risks discussed. Consent was given by the patient. Immediately prior to procedure a time out was called to verify  the correct patient, procedure, equipment, support staff and site/side marked as required. Patient was prepped and draped in the usual sterile fashion.